# Patient Record
Sex: FEMALE | Race: WHITE | Employment: UNEMPLOYED | URBAN - METROPOLITAN AREA
[De-identification: names, ages, dates, MRNs, and addresses within clinical notes are randomized per-mention and may not be internally consistent; named-entity substitution may affect disease eponyms.]

---

## 2019-01-04 ENCOUNTER — APPOINTMENT (OUTPATIENT)
Dept: CT IMAGING | Age: 19
End: 2019-01-04
Attending: STUDENT IN AN ORGANIZED HEALTH CARE EDUCATION/TRAINING PROGRAM
Payer: SELF-PAY

## 2019-01-04 ENCOUNTER — HOSPITAL ENCOUNTER (EMERGENCY)
Age: 19
Discharge: HOME | End: 2019-01-04
Attending: EMERGENCY MEDICINE
Payer: SELF-PAY

## 2019-01-04 ENCOUNTER — APPOINTMENT (OUTPATIENT)
Dept: ULTRASOUND IMAGING | Age: 19
End: 2019-01-04
Attending: STUDENT IN AN ORGANIZED HEALTH CARE EDUCATION/TRAINING PROGRAM
Payer: SELF-PAY

## 2019-01-04 VITALS
HEART RATE: 88 BPM | OXYGEN SATURATION: 100 % | RESPIRATION RATE: 18 BRPM | WEIGHT: 128.09 LBS | TEMPERATURE: 99 F | DIASTOLIC BLOOD PRESSURE: 65 MMHG | SYSTOLIC BLOOD PRESSURE: 118 MMHG

## 2019-01-04 DIAGNOSIS — R10.31 ABDOMINAL PAIN, RIGHT LOWER QUADRANT: Primary | ICD-10-CM

## 2019-01-04 DIAGNOSIS — R11.2 NAUSEA AND VOMITING, INTRACTABILITY OF VOMITING NOT SPECIFIED, UNSPECIFIED VOMITING TYPE: ICD-10-CM

## 2019-01-04 LAB
APPEARANCE UR: ABNORMAL
BACTERIA URNS QL MICRO: ABNORMAL /HPF
BILIRUB UR QL: NEGATIVE
COLOR UR: ABNORMAL
EPITH CASTS URNS QL MICRO: ABNORMAL /LPF
GLUCOSE UR STRIP.AUTO-MCNC: NEGATIVE MG/DL
HGB UR QL STRIP: NEGATIVE
KETONES UR QL STRIP.AUTO: 15 MG/DL
LEUKOCYTE ESTERASE UR QL STRIP.AUTO: NEGATIVE
NITRITE UR QL STRIP.AUTO: NEGATIVE
PH UR STRIP: 6 [PH] (ref 5–8)
PROT UR STRIP-MCNC: ABNORMAL MG/DL
RBC #/AREA URNS HPF: ABNORMAL /HPF (ref 0–5)
SP GR UR REFRACTOMETRY: 1.02 (ref 1–1.03)
UROBILINOGEN UR QL STRIP.AUTO: 1 EU/DL (ref 0.2–1)
WBC URNS QL MICRO: ABNORMAL /HPF (ref 0–4)

## 2019-01-04 PROCEDURE — 74177 CT ABD & PELVIS W/CONTRAST: CPT

## 2019-01-04 PROCEDURE — 87086 URINE CULTURE/COLONY COUNT: CPT

## 2019-01-04 PROCEDURE — 74011000258 HC RX REV CODE- 258: Performed by: RADIOLOGY

## 2019-01-04 PROCEDURE — 81001 URINALYSIS AUTO W/SCOPE: CPT

## 2019-01-04 PROCEDURE — 74011636320 HC RX REV CODE- 636/320: Performed by: RADIOLOGY

## 2019-01-04 PROCEDURE — 76830 TRANSVAGINAL US NON-OB: CPT

## 2019-01-04 PROCEDURE — 76856 US EXAM PELVIC COMPLETE: CPT

## 2019-01-04 PROCEDURE — 99283 EMERGENCY DEPT VISIT LOW MDM: CPT

## 2019-01-04 RX ORDER — ONDANSETRON 4 MG/1
4 TABLET, ORALLY DISINTEGRATING ORAL
Qty: 10 TAB | Refills: 0 | Status: SHIPPED | OUTPATIENT
Start: 2019-01-04

## 2019-01-04 RX ORDER — SODIUM CHLORIDE 0.9 % (FLUSH) 0.9 %
10 SYRINGE (ML) INJECTION
Status: COMPLETED | OUTPATIENT
Start: 2019-01-04 | End: 2019-01-04

## 2019-01-04 RX ADMIN — IOPAMIDOL 100 ML: 755 INJECTION, SOLUTION INTRAVENOUS at 20:18

## 2019-01-04 RX ADMIN — Medication 10 ML: at 20:18

## 2019-01-04 RX ADMIN — SODIUM CHLORIDE 100 ML: 900 INJECTION, SOLUTION INTRAVENOUS at 20:19

## 2019-01-04 NOTE — ED PROVIDER NOTES
Danna Mast 
22 y.o. Patient presents with: 
Fever Abdominal Pain Vomiting This patient is a 76-year-old female who presents with abdominal pain, and leukocytosis from urgent care. The patient was referred to the emergency department to rule out appendicitis. The patient states last night she experienced sharp right lower quadrant abdominal pain which wraps around her anterior abdomen, and has remained constant since onset. Pressure in the abdomen makes it worse, but nothing helps the discomfort. The patient did not go to work today, due to feeling ill. She also has endorsed vomiting. She does not have any past medical history, and currently has an IUD for contraception. She is sexually active, however does not complain of any vaginal discharge or malodorous discharged. Labs and urine were collected at the urgent care, which showed a negative pregnancy test, a urinalysis without signs of overt infection, and leukocytosis over 20. Past Medical History:  
Diagnosis Date  ADD (attention deficit disorder) History reviewed. No surgical history. Family history: Twin Sister Living. Social History Socioeconomic History  Marital status: SINGLE Spouse name: Not on file  Number of children: Not on file  Years of education: Not on file  Highest education level: Not on file Social Needs  Financial resource strain: Not on file  Food insecurity - worry: Not on file  Food insecurity - inability: Not on file  Transportation needs - medical: Not on file  Transportation needs - non-medical: Not on file Occupational History  Not on file Tobacco Use  Smoking status: Never Smoker  Smokeless tobacco: Never Used Substance and Sexual Activity  Alcohol use: No  
  Frequency: Never  Drug use: No  
 Sexual activity: Not on file Other Topics Concern  Not on file Social History Narrative  Not on file ALLERGIES: Patient has no known allergies. Review of Systems Constitutional: Positive for fever. Genitourinary: Negative for dysuria. All other systems reviewed and are negative. Vitals:  
 01/04/19 1711 01/04/19 1716 BP:  118/65 Pulse:  90 Resp:  18 Temp:  99.7 °F (37.6 °C) SpO2:  100% Weight: 58.1 kg (128 lb 1.4 oz) Physical Exam  
 
 
PE: 
GEN:  WDWN female alert non toxic in NAD SK: Acyanotic, Warm extremities. No lesions, no rashes HEENT: H: AT/NC. E: EOMI, E: TM clear  N/T: Clear oropharynx NECK: Supple, Full range of motion. No Masses Chest: Clear to auscultation. No rales, rhonchi, wheezes or distress. No Retraction. Chest Wall: No tenderness on palpation, Equal chest rise CV: Regular rate and rhythm. Normal S1 S2 . No murmur, gallops or thrills ABD: Soft lower abdominal tenderness without focality, no Hepatosplenomegaly, no guarding, no masses MS: FROM all extremities, no long bone tenderness. No swelling, cyanosis, no edema. NEURO: Alert. No focality. Cranial nerves 2-12 grossly intact. GCS 15 MDM The patient presents for rule out appendicitis. An ultrasound was obtained transabdominal, and transvaginally. Both of these did not reveal any acute abnormality, however did not visualize the appendix. The patient then underwent a CT scan, showing no evidence of appendicitis, however a small adnexal cyst, likely an ovarian follicle was visualized. I discussed with the patient she did not have appendicitis, and provided return precautions. She was understanding of this, and was discharged in stable condition. Procedures

## 2019-01-04 NOTE — LETTER
[address] 
[address] [place] [facility] DEPT 
[address] [facility] [address] [id number] 
[phone] Work/School Note Date: 1/4/2019 To Whom It May concern: Criselda Pulling was seen and treated today in the emergency room by the following provider(s): 
Attending Provider: Ferny Tompkins MD 
Resident: Prince Adam MD. Criselda Pulling may return to work on 1/7/19 Sincerely, Dianne Shields RN

## 2019-01-04 NOTE — ED TRIAGE NOTES
Triage Note: Pt reports lower bilateral abdominal and vomiting that started last night. Pt seen at [facility] and referred to ED for WBC of 21.6. Pt reports nausea has improved, but abdominal pain continues.

## 2019-01-05 NOTE — DISCHARGE INSTRUCTIONS
Patient Education     Thank you for allowing us to care for you in the Emergency Department. Please return if the condition does not improve in 48 hours, or gets worse. Please follow-up with your regular doctor as soon as possible. Return to the emergency department with any concern of allergic reaction (itching, hives, shortness of breath, etc.)    Abdominal Pain: Care Instructions  Your Care Instructions    Abdominal pain has many possible causes. Some aren't serious and get better on their own in a few days. Others need more testing and treatment. If your pain continues or gets worse, you need to be rechecked and may need more tests to find out what is wrong. You may need surgery to correct the problem. Don't ignore new symptoms, such as fever, nausea and vomiting, urination problems, pain that gets worse, and dizziness. These may be signs of a more serious problem. Your doctor may have recommended a follow-up visit in the next 8 to 12 hours. If you are not getting better, you may need more tests or treatment. The doctor has checked you carefully, but problems can develop later. If you notice any problems or new symptoms, get medical treatment right away. Follow-up care is a key part of your treatment and safety. Be sure to make and go to all appointments, and call your doctor if you are having problems. It's also a good idea to know your test results and keep a list of the medicines you take. How can you care for yourself at home? · Rest until you feel better. · To prevent dehydration, drink plenty of fluids, enough so that your urine is light yellow or clear like water. Choose water and other caffeine-free clear liquids until you feel better. If you have kidney, heart, or liver disease and have to limit fluids, talk with your doctor before you increase the amount of fluids you drink. · If your stomach is upset, eat mild foods, such as rice, dry toast or crackers, bananas, and applesauce.  Try eating several small meals instead of two or three large ones. · Wait until 48 hours after all symptoms have gone away before you have spicy foods, alcohol, and drinks that contain caffeine. · Do not eat foods that are high in fat. · Avoid anti-inflammatory medicines such as aspirin, ibuprofen (Advil, Motrin), and naproxen (Aleve). These can cause stomach upset. Talk to your doctor if you take daily aspirin for another health problem. When should you call for help? Call 911 anytime you think you may need emergency care. For example, call if:    · You passed out (lost consciousness).     · You pass maroon or very bloody stools.     · You vomit blood or what looks like coffee grounds.     · You have new, severe belly pain.    Call your doctor now or seek immediate medical care if:    · Your pain gets worse, especially if it becomes focused in one area of your belly.     · You have a new or higher fever.     · Your stools are black and look like tar, or they have streaks of blood.     · You have unexpected vaginal bleeding.     · You have symptoms of a urinary tract infection. These may include:  ? Pain when you urinate. ? Urinating more often than usual.  ? Blood in your urine.     · You are dizzy or lightheaded, or you feel like you may faint.    Watch closely for changes in your health, and be sure to contact your doctor if:    · You are not getting better after 1 day (24 hours). Where can you learn more? Go to http://collin-asha.info/. Enter J834 in the search box to learn more about \"Abdominal Pain: Care Instructions. \"  Current as of: November 20, 2017  Content Version: 11.8  © 6490-9281 [employer]. Care instructions adapted under license by [employer] (which disclaims liability or warranty for this information).  If you have questions about a medical condition or this instruction, always ask your healthcare professional. Haven Hamilton any warranty or liability for your use of this information.

## 2019-01-05 NOTE — ED NOTES
EDUCAITON: Patient education given on zofran and the patient expresses understanding and acceptance of instructions.  Randa Bui RN 1/4/2019 9:07 PM

## 2019-01-06 LAB
BACTERIA SPEC CULT: NORMAL
CC UR VC: NORMAL
SERVICE CMNT-IMP: NORMAL